# Patient Record
Sex: FEMALE | Race: WHITE
[De-identification: names, ages, dates, MRNs, and addresses within clinical notes are randomized per-mention and may not be internally consistent; named-entity substitution may affect disease eponyms.]

---

## 2017-12-30 NOTE — PHYS DOC
Past History


Past Medical History:  No Pertinent History


Past Surgical History:  , Other


Alcohol Use:  None


Drug Use:  None





Adult General


Chief Complaint


Chief Complaint:  DYSPNEA/RESPIRATOY DISTRESS





HPI


HPI





This patient is a pleasant 42-year-old female with a 3 to four-day history of 

URI like symptoms include runny nose, nonproductive cough that is not 

responding to Mucinex over-the-counter medications at home. Tonight she came in 

specifically because she developed a mild frontal headache that was lancinating 

in nature now resolved. It is not worse of life and sudden onset she just felt 

warm since of flushing along with this "" fever. She is not felt well for some 

time. She's had sick contacts at home with similar symptoms she has not used 

any recent antibiotic's, no travel outside the country, no chest pain, no 

abdominal pain, no nausea and vomiting with her symptoms. Patient further 

denies any joint pain, myalgias or rash. She denies any night sweats or weight 

loss. She denies any shortness of breath on exertion she denies any chest pain





Review of Systems


Review of Systems





Constitutional: Patient has had subjective fevers and chills


Eyes: Denies change in visual acuity, redness, or eye pain []


HENT: sHe has had nasal congestion and sore throat with cough but no change in 

voice


Respiratory he has had a nonproductive cough but no shortness of breath[]


Cardiovascular: No additional information not addressed in HPI []


GI: Denies abdominal pain, nausea, vomiting, bloody stools or diarrhea []


: Denies dysuria or hematuria []


Musculoskeletal: Denies back pain or joint pain []


Integument: Denies rash or skin lesions []


Neurologic: He did have a slight headache today this resolved not worse of life 

and sudden onset described as a lancing sharp on the right temple no sensory 

changes


Endocrine: Denies polyuria or polydipsia []





All other systems were reviewed and found to be within normal limits, except as 

documented in this note.





Current Medications


Current Medications





Current Medications








 Medications


  (Trade)  Dose


 Ordered  Sig/Jagdish  Start Time


 Stop Time Status Last Admin


Dose Admin


 


 Sodium Chloride  1,000 ml @ 


 1,000 mls/hr  1X  ONCE  17 18:30


 17 19:29  17 18:32


1,000 MLS/HR











Allergies


Allergies





Allergies








Coded Allergies Type Severity Reaction Last Updated Verified


 


  prochlorperazine Allergy Intermediate  17 Yes











Physical Exam


Physical Exam


Vital signs on the chart patient noted to be tachycardic like secondary to 

decreased fluid intake and her present not feeling well


Constitutional: Well developed, well nourished, no acute distress, non-toxic 

appearance. []


HENT: Normocephalic, atraumatic, bilateral external ears normal, oropharynx 

mildly dry, no oral exudates, patient is a clear rhinorrhea with mild nasal 

congestion no facial tenderness[]


Eyes: PERRLA, EOMI, conjunctiva normal, no discharge. [] 


Neck: Normal range of motion, no tenderness, supple, no stridor. [] 


Cardiovascular: Tachycardia noted no murmurs, rubs[]


Lungs & Thorax:  Bilateral breath sounds clear to auscultation []


Skin: Warm, dry, no erythema, no rash. [] 


Extremities: No tenderness, no cyanosis, no clubbing, ROM intact, no edema. [] 


Neurologic: Alert and oriented X 3, normal motor function, normal sensory 

function, no focal deficits noted. []


Psychologic: Affect normal, judgement normal, mood normal. []





Current Patient Data


Vital Signs





 Vital Signs








  Date Time  Temp Pulse Resp B/P (MAP) Pulse Ox O2 Delivery O2 Flow Rate FiO2


 


17 18:10 98.9 122 20  98 Room Air  











EKG


EKG


[]





Radiology/Procedures


Radiology/Procedures


[]Patient's PA and lateral chest x-ray reviewed and read by me at 7:30 PM 

demonstrates no acute infiltrate no pneumothorax, no pneumonia.





Course & Med Decision Making


Course & Med Decision Making


Pertinent Labs and Imaging studies reviewed. (See chart for details)


She presents with URI-like symptoms with subjective fever at home tachycardia 

noted on arrival I will screen patient flew as well as pneumonia. Patient's 

given a liter of fluids tachycardia is resolved. Patient's influenza swab was 

positive for flu B. Information was passed on the family bedside patient given 

dose Tamiflu.








[]





Dragon Disclaimer


Dragon Disclaimer


This electronic medical record was generated, in whole or in part, using a 

voice recognition dictation system.





Departure


Departure:


Impression:  


 Primary Impression:  


 Influenza B


Disposition:  01 HOME, SELF-CARE


Condition:  STABLE


Referrals:  


ANUPAMA SCHROEDER MD (PCP)


Patient Instructions:  Influenza Facts, Influenza, Adult





Additional Instructions:  


discharge:





I've spoken with the patient and/or caregivers. I've explained the patient's 

condition, diagnosis and treatment plan based on information available to me at 

this time. I've answered the patient's and/or caregivers questions and 

addressed any concerns. The patient and/or caregivers have a good understanding 

the patient's diagnosis, condition and treatment plan as can be expected at 

this point. Vital signs have been stabilized. The patient's condition is stable 

for discharge from the emergency department.





The patient will pursue further outpatient evaluation with her primary care 

provider or other designated consulting physician as outlined in the discharge 

instructions. Patient and/or caregivers are agreeable to this plan of care and 

follow-up instructions have been explained in detail. The patient and/or 

caregivers have received these instructions in written format and expressed 

understanding of these discharge instructions. The patient and her caregivers 

are aware that if any significant change in condition or worsening of symptoms 

should prompt him to immediately return to this of the closest emergency 

department.  If an emergent department is not readily available I would 

encourage him to call 911.


Scripts


Guaifenesin/Dextromethorphan (MUCINEX DM ER 1,200-60 MG TAB) 1 Each Tbmp.12hr


1 TAB PO BID, #20 TAB 1 Refill


   Prov: BETY CARRENO MD         17 


Naproxen (NAPROSYN) 500 Mg Tablet


1 TAB PO BID, #20 TAB 1 Refill


   Prov: BETY CARRENO MD         17 


Oseltamivir Phosphate (TAMIFLU) 75 Mg Capsule


1 CAP PO BID for 5 Days, #10 CAP


   Prov: BETY CARRENO MD         17











BETY CARRENO MD Dec 30, 2017 19:09

## 2017-12-31 NOTE — RAD
Chest radiograph 12/30/2017 8:17 PM



Indication: Cough, dizziness



Comparison: None available



Technique: PA and lateral views of the chest are provided.



Findings:



Cardiomediastinal silhouette is within normal limits. No pleural effusions,

pulmonary vascular congestion or pneumothorax. The lungs are clear.



Osseous structures are normal.



Impression: 



No acute cardiopulmonary process.

## 2018-01-02 NOTE — RAD
CTA of the chest with contrast, 1/2/2018:



History: Elevated d-dimer, flu symptoms



Multidetector CT imaging was performed following an IV bolus injection of

iodinated contrast material. Multiplanar reconstructions were produced

including coronal and sagittal MIP images.



The main and lobar pulmonary arteries show no filling defects to suggest

pulmonary emboli. Some of the smaller pulmonary arteries are less well

opacified due to technical factors. No definite pulmonary emboli are

identified. The thoracic aorta is unremarkable. No mediastinal or hilar

adenopathy is seen.



There is minimal linear atelectasis or scarring posteriorly in the right lung

base. No pulmonary mass is identified. There is no evidence of pleural fluid.



The liver is of lower than normal density in a diffuse pattern compatible with

fatty change. Numerous calcified gallstones are present in the gallbladder.

The gallbladder is not distended and no pericholecystic edema is evident.





IMPRESSION:

1. No CT evidence of central pulmonary emboli.

2. Minimal right basilar atelectasis.

3. Hepatic steatosis.

4. Cholelithiasis











PQRS Compliance Statement:



One or more of the following individualized dose reduction techniques were

utilized for this examination:

1. Automated exposure control

2. Adjustment of the mA and/or kV according to patient size

3. Use of iterative reconstruction technique

## 2018-01-02 NOTE — RAD
INDICATION: tingling around the  mouth



COMPARISON: None.



TECHNIQUE: Axial CT images obtained through the head without intravenous

contrast.



FINDINGS:



No intracranial hemorrhage.

No midline shift.  Basal cisterns patents.

Ventricles and sulci are unremarkable.

No acute osseous abnormality.

Orbits and paranasal sinuses partially seen and unremarkable.





IMPRESSION:

1. No acute intracranial hemorrhage.





PQRS Compliance Statement:



One or more of the following individualized dose reduction techniques were

utilized for this examination:

1. Automated exposure control

2. Adjustment of the mA and/or kV according to patient size

3. Use of iterative reconstruction technique

## 2018-01-02 NOTE — EKG
[facility] [address], [place] [address]

Test Date:    2018               Test Time:    03:17:59

Pat Name:     HERMINIO SORIANO         Department:   

Patient ID:   [id number]           Room:         120 A

Gender:       F                        Technician:   CONNIE

:          1975               Requested By: BETY CARRENO

Order Number: [id number]            Reading MD:   Gareth Navarro MD

                                 Measurements

Intervals                              Axis          

Rate:         126                      P:            -54

CA:           108                      QRS:          -5

QRSD:         84                       T:            26

QT:           352                                    

QTc:          510                                    

                           Interpretive Statements

SINUS TACHYCARDIA

NON-SPECIFIC ST/T CHANGES

Electronically Signed On 2018 12:38:30 CST by Gareth Navarro MD

## 2018-01-02 NOTE — HP
ADMIT DATE:  2018



HISTORY OF PRESENT ILLNESS:  A 42-year-old female came in through the Emergency

Room 5-day history of nonproductive cough and having difficulty with breathing,

severe frontal headache, fever up to 103.  The patient was admitted for further

evaluation and treatment, had influenza precautions made, continue to monitor

the patient, currently make further evaluation on her as indicated.



PAST MEDICAL HISTORY:  No pertinent history.



PAST SURGICAL HISTORY:  .



SOCIAL HISTORY:  Denies alcohol or drug use or smoking.



FAMILY HISTORY:  Noncontributory.



ALLERGIES:  COMPAZINE.



HOME MEDICATIONS:  Mucinex, naproxen 500 b.i.d., Tamiflu 75 mg b.i.d.



REVIEW OF SYSTEMS:  Positive for generalized achiness, fever, chills.  The

patient's sedimentation rate elevated, severe soreness over the right shoulder.



PHYSICAL EXAMINATION:

GENERAL:  She is a pleasant white female, moderate amount of distress.

VITAL SIGNS:  Blood pressure 116/70, respiratory rate 22, pulse 136, temperature

of 103.

HEENT:  The patient's head was atraumatic, normocephalic.  Eyes:  PERRLA without

jaundice.  The mouth and throat were normal.

NECK:  Supple, without JVD, carotid bruits.  No thyromegaly.

Some tenderness over the right shoulder area; some redness, tenderness noted.

EXTREMITIES:  No clubbing, cyanosis, or edema.

RESPIRATORY:  Lungs are diminished throughout, poor movement of air, but clear.

CARDIOVASCULAR:  Regular sinus rhythm, S1, S2, tachycardic.

ABDOMEN:  Soft, nontender, no rebound or guarding.  Positive bowel sounds, no

hepatosplenomegaly.  No nuchal rigidity.

NEUROLOGIC:  Alert and oriented x 3.  Speech fluent, spontaneous, appropriate. 

Cranial nerves 2-12 grossly intact, answering all questions well.



LABORATORY DATA:  The white count was basically unremarkable except for a sed

rate of 66.  Chemistries outside of a blood sugar of 150, was unremarkable. 

Coags did show an elevated D-dimer.  CTA was unremarkable.  The patient

otherwise will be continued to be monitored carefully, make further evaluation

on her.



IMPRESSION:  Influenza, dehydration, sinus tachycardia, IV fluids, IV antibiotic

for possible cellulitis to the right shoulder, and have further evaluation as

indicated per results.





______________________________

PETER J. ALEXANDRE, MD



DR:  CHUCK/king  JOB#:  [id number] / [id number]

DD:  2018 18:35  DT:  2018 19:06

## 2018-01-02 NOTE — RAD
Chest, 2 views, 1/2/2018:



History: Cough, worsening shortness of breath



Comparison is made to a study from 12/30/2017. The heart size and pulmonary

vascularity are normal. No pulmonary infiltrates are seen. There is no

evidence of pleural fluid.



IMPRESSION: No acute cardiopulmonary abnormality is detected.

## 2018-01-02 NOTE — PHYS DOC
Past History


Past Medical History:  No Pertinent History


Past Surgical History:  , Other


Alcohol Use:  None


Drug Use:  None





Adult General


Chief Complaint


Chief Complaint:  SHORTNESS OF BREATH





HPI


HPI





This patient is a pleasant 42-year-old female with a 5-day history of URI like 

symptoms include runny nose, nonproductive cough that is not responding to 

Mucinex over-the-counter medications at home. Tonight she came in specifically 

because she developed a mild frontal headache that was lancinating in nature 

now resolved. It is not worse of life and sudden onset she just felt warm since 

of flushing along with this "" fever. She is not felt well for some time. She's 

had sick contacts at home with similar symptoms she has not used any recent 

antibiotic's, no travel outside the country, no chest pain, no abdominal pain, 

no nausea and vomiting with her symptoms. Patient further denies any joint pain

, myalgias or rash. She denies any night sweats or weight loss. Tonight she 

returns again because she has worsening shortness of breath and fever now to 

103. She is not on antibiotics. She denies any recent travel outside the 

country is having significant sick contacts at home





Review of Systems


Review of Systems





Constitutional: Fevers to 103.10 chills


Eyes: Denies change in visual acuity, redness, or eye pain []


HENT: Increasing his congestion or sore throat with cough


Respiratory: Increasing cough and now shortness of breath


Cardiovascular: No additional information not addressed in HPI []


GI: Denies abdominal pain, nausea, vomiting, bloody stools or diarrhea []


: Denies dysuria or hematuria []


Musculoskeletal: Denies back pain or joint pain []


Integument: Denies rash or skin lesions []


Neurologic: Mild frontal headache nothing new weakness or sensory changes


Endocrine: Denies polyuria or polydipsia []





All other systems were reviewed and found to be within normal limits, except as 

documented in this note.





Allergies


Allergies





Allergies








Coded Allergies Type Severity Reaction Last Updated Verified


 


  prochlorperazine Allergy Intermediate  17 Yes











Physical Exam


Physical Exam


Other vital signs recorded the chart patient tachycardic to 130s with fever 103 

appropriate.


Constitutional: Well developed, well nourished, patient feels uncomfortable no 

active acute distress nontoxic


HENT: Normocephalic, atraumatic, bilateral external ears normal, oropharynx 

moist, mild erythema no oral exudates, nose normal. []


Eyes: PERRLA, EOMI, conjunctiva normal, no discharge. [] 


Neck: Normal range of motion, no tenderness, supple, coarse respirations with 

no obvious stridor[] 


Cardiovascular:Heart rate regular rhythm, no murmur []


Lungs & Thorax:  Bilateral breath sounds clear to auscultation []


Skin: Warm,  Patient's skin. Feels warm and flushed it was noted later in the 

patient's evaluation by the nurse and then passed on to me the patient had a 

significant area of erythema and warmth without induration of the right 

shoulder complaints most of the shoulder anterior portion of the biceps and the 

chest wall.[] 


Back: No tenderness, no CVA tenderness. [] 


Extremities: No tenderness, no cyanosis, no clubbing, ROM intact, no edema. [] 


Neurologic: Alert and oriented X 3, normal motor function, normal sensory 

function, no focal deficits noted. []





Current Patient Data


Lab Results





 Laboratory Tests








Test


  18


03:20


 


White Blood Count


  8.0 x10^3/uL


(4.0-11.0)


 


Red Blood Count


  4.48 x10^6/uL


(3.50-5.40)


 


Hemoglobin


  12.0 g/dL


(12.0-15.5)


 


Hematocrit


  35.9 %


(36.0-47.0)  L


 


Mean Corpuscular Volume


  80 fL ()


 


 


Mean Corpuscular Hemoglobin 27 pg (25-35)  


 


Mean Corpuscular Hemoglobin


Concent 34 g/dL


(31-37)


 


Red Cell Distribution Width


  16.7 %


(11.5-14.5)  H


 


Platelet Count


  178 x10^3/uL


(140-400)


 


Neutrophils (%) (Auto) 91 % (31-73)  H


 


Lymphocytes (%) (Auto) 4 % (24-48)  L


 


Monocytes (%) (Auto) 5 % (0-9)  


 


Eosinophils (%) (Auto) 0 % (0-3)  


 


Basophils (%) (Auto) 0 % (0-3)  


 


Neutrophils # (Auto)


  7.3 x10^3uL


(1.8-7.7)


 


Lymphocytes # (Auto)


  0.3 x10^3/uL


(1.0-4.8)  L


 


Monocytes # (Auto)


  0.4 x10^3/uL


(0.0-1.1)


 


Eosinophils # (Auto)


  0.0 x10^3/uL


(0.0-0.7)


 


Basophils # (Auto)


  0.0 x10^3/uL


(0.0-0.2)











EKG


EKG


EKG read by me time 3:17 a.m. 2000 618 devastate sinus tachycardia P 

whenever QRS with a heart rate of 126. MN interval 108 QRS width of 84 to smoke

, QTC is 410 which is abnormally long prolonged QT patient is no ST segment or T

-wave changes consistent with acute coronary ischemia. She has some T-wave 

flattening lead 3 which may be rate related[]





Radiology/Procedures


Radiology/Procedures


[]





Course & Med Decision Making


Course & Med Decision Making


Pertinent Labs and Imaging studies reviewed. (See chart for details)





[]A she presents for the second time him on part with worsening symptoms of 

cough fever not feeling well. Patient was diagnosed influenza several days ago 

and placed on Tamiflu. She is been taking Tylenol when necessary for her other 

symptoms but she was worried about this increasing cough. Patient's chest x-ray 

which was repeated today PA and lateral read by me to Mrs. no acute 

cardiopulmonary infiltrate or other finding. Patient's CBC is normal. Time is 

now 3:15 AM





At 4:00 AM nurse noted increased warmth and induration although she was not 

complaining of any right shoulder pain or chest wall pain patient had no other 

complaints. This may be the source of her fever and increasing myalgias. At 

this point because patient patient's tachycardia and fever improved with fluids 

and Tylenol. I will add blood cultures and lactic acid this point initiate 

antibiotics for potential skin infection because of high fevers although do not 

believe it's neckties and fasciitis who treated aggressively a fourth 

generation penicillin and a vancomycin dose. Patient only admitted to the 

hospital for closer monitoring and evaluation





Consultant note: Dr. Kedar Almaraz





Consultant called at of the service service: 4:10 AM


Consult called back at 4:11 AM





Discussed the case I presented and they agreed with admission. Time of 

acceptance 411 AM





Lesia Disclaimer


Lesia Disclaimer


This electronic medical record was generated, in whole or in part, using a 

voice recognition dictation system.





Departure


Departure:


Impression:  


 Primary Impression:  


 Influenza


 Additional Impressions:  


 Fever


 Cellulitis


Disposition:  09 ADMITTED AS INPATIENT


Condition:  GUARDED


Referrals:  


KEDAR ALMARAZ MD (PCP)





Problem Qualifiers











BETY CARRENO MD 2018 03:11

## 2018-01-03 NOTE — CARD
MR#: [id number]

Account#: [id number]

Accession#: [id number]

Date of Study: 01/02/2018

Ordering Physician: ANUPAMA SCHROEDER, 

Referring Physician: ANUPAMA SCHROEDER, 

Tech: Krys Manuel RDCS





--------------- APPROVED REPORT --------------





EXAM: Two-dimensional and M-mode echocardiogram with Doppler and color Doppler.



Other Information 

Quality : AverageHR: 90bpm

Rhythm : NSR



INDICATION

Sinus Tachycardia



2D DIMENSIONS 

Left Atrium(2D)3.8 (1.6-4.0cm)IVSd0.7 (0.7-1.1cm)

Aortic Root(2D)2.6 (2.0-3.7cm)LVDd5.0 (3.9-5.9cm)

LVOT Diameter2.3 (1.8-2.4cm)PWd0.7 (0.7-1.1cm)

LVDs3.2 (2.5-4.0cm)FS (%) 35.6 %

SV77.0 mlLVEF(%)64.9 (>50%)



Aortic Valve

AoV Peak Thien.157.7cm/sAoV VTI28.1cm

AO Peak GR.10.0mmHgLVOT Peak Thien.122.9cm/s

LVOT  VTI 23.22cmAO Mean GR.5mmHg

OMAIRA (VMAX)3.12qk4WJM   (VTI)3.34cm2



Mitral Valve

MV E Zjawnsod77.5cm/sMV E Peak Gr.4mmHg

MV DECEL [id number] A Fbheulfk55.6cm/s

MV E Mean Gr.2mmHgE/A  Ratio1.1



Pulmonary Valve

PV Peak Oxfstacd374.8cm/sPV Peak Grad.6mmHg



Tricuspid Valve

TR P. Jfknmqcf312oj/sTR Peak Gr.26mmHg



Pulmonary Vein

S1 Guzhvkcg54.7cm/sD2 Mpdrvrti79.3cm/s



 LEFT VENTRICLE 

The left ventricle is normal size. There is normal left ventricular wall thickness. The left ventricu
lar systolic function is normal and the ejection fraction is within normal range. EF 65% There is nor
mal LV segmental wall motion. The left ventricular diastolic function and filling is normal for age.



 RIGHT VENTRICLE 

The right ventricle is normal size. There is normal right ventricular wall thickness. The right ventr
icular systolic function is normal.



 ATRIA 

The left atrium size is normal. The right atrium size is normal. The interatrial septum is intact wit
h no evidence for an atrial septal defect or patent foramen ovale as noted on 2-D or Doppler imaging.




 AORTIC VALVE 

The aortic valve is normal in structure and function. Doppler and Color Flow revealed no significant 
aortic regurgitation. There is no significant aortic valvular stenosis. There is no aortic valvular v
egetation.



 MITRAL VALVE 

The mitral valve is normal in structure and function. There is no evidence of mitral valve prolapse. 
There is no mitral valve stenosis. Doppler and Color Flow revealed no mitral valve regurgitation note
d.



 TRICUSPID VALVE 

The tricuspid valve is normal in structure and function. Doppler and Color Flow revealed trace tricus
pid regurgitation. There is no tricuspid valve prolapse or vegetation. There is no tricuspid valve st
enosis.



 PULMONIC VALVE 

Doppler and Color Flow revealed trace pulmonic valvular regurgitation. There is no pulmonic valvular 
stenosis.



 GREAT VESSELS 

The aortic root is normal in size. The ascending aorta is normal in size. IVC not well visualized. 



 PERICARDIAL EFFUSION 

There is no pleural effusion. There is no evidence of significant pericardial effusion.



Critical Notification

Critical Value: No



<Conclusion>

The left ventricular systolic function is normal and the ejection fraction is within normal range. EF
 65%

There is normal LV segmental wall motion.

The left ventricular diastolic function and filling is normal for age.



Signed by : Gareth Navarro, 

Electronically Approved : 01/03/2018 09:20:32

## 2018-01-03 NOTE — CONS
DATE OF CONSULTATION:  2018



NEUROLOGIC CONSULTATION



REFERRING PHYSICIAN:  Dr. Almaraz.



REASON FOR CONSULTATION:  Numbness and paresthesia of the face and upper

extremities.



HISTORY OF PRESENT ILLNESS:  This is a 42-year-old right-handed white female,

who was admitted through Emergency Room after she presented with 5-day history

of upper respiratory infections and flu-like symptoms described as burning nose

with nonproductive cough and recently having fever and severe frontal headaches

without nausea, vomiting, photophobia or phonophobia.  The patient has recently

noticed increased numbness and intermittent numbness and paresthesia of the face

bilaterally and sometimes of the upper and lower extremities.  She denies

itching or having rash.  She also complains of shortness of breath without chest

pain.  High temperature today was 103.  She was admitted for further evaluation

for possible systemic infections.  The patient also complains of intermittent

numbness and paresthesia of the distal upper extremities and with nocturnal

exacerbations. She denies weakness, vertigo, dysarthria, dysphagia or visual

disturbances.



PAST MEDICAL HISTORY:  Unremarkable.



PAST SURGICAL HISTORY:  Significant for .



SOCIAL HISTORY:  The patient denies smoking, alcohol drinking, or illicit drug

use.



FAMILY HISTORY:  Noncontributory.



ALLERGIES:  COMPAZINE.



CURRENT HOME MEDICATIONS:  Mucinex, Tamiflu 75 mg twice daily, and naproxen 500

mg twice daily.



REVIEW OF SYSTEMS:  A 10-point review of system was performed and as mentioned

above in history of present illness.



PHYSICAL EXAMINATION:

GENERAL:  Well-developed and well-nourished white female, in no acute distress.

VITAL SIGNS:  Blood pressure 148/52, respiratory rate is 20, pulse is 85, oxygen

saturation 97% and temperature _____.

HEENT:  Normocephalic, atraumatic, otherwise unremarkable.

NECK:  Supple.  Negative for carotid bruit, lymphadenopathy, thyromegaly  or

JVD.

LUNGS:  Clear to A and P.

CARDIOVASCULAR:  Regular rate and rhythm, normal S1 and S2.  There is no S3, S4

or murmur.

ABDOMEN:  Soft.  Bowel sounds positive.

EXTREMITIES:  Negative for cyanosis, clubbing or pitting edema.

NEUROLOGIC:  Tinel's and Phalen's signs were subjectively present over the

median nerve at the wrist bilaterally.

MENTAL STATUS:  The patient is alert and oriented x 3.  The speech is fluent. 

There is no language dysfunction.  Memory, judgment, and abstract thinking are

normal.  The patient denies hallucination or delusion.

CRANIAL NERVES:  Visual fields are full.  The pupils are reactive to light and

accommodation.  Extraocular movements are intact.  There is no nystagmus.  There

is no facial motor or sensory deficit.  Hearing is intact bilaterally.  The

palate is elevated symmetrically.  Sternocleidomastoid muscles are powerful

bilaterally.  The patient shrugs her shoulders symmetrically.  Protrudes her

tongue in the midline without fasciculation or atrophy.

MOTOR:  No focal muscle bulk was seen.  The tone is normal.  The strength is 5/5

throughout.

SENSORY:  Examination revealed diminished pinprick and light touch senses in

both hands in patchy distributions.  Otherwise unremarkable.   Deep tendon

reflexes were symmetric and active without pathology responses.  Gait and

coordination are normal.



DIAGNOSTIC DATA:  Nonenhanced CT scan revealed no evidence of acute intracranial

process.  CT angio revealed no evidence of pulmonary emboli, but showed right

basilar atelectasis, with hepatic steatosis and cholelithiasis.  Chest x-ray

revealed no acute cardiopulmonary process.



LABORATORY DATA:  CBC revealed white blood cells of 8000, hemoglobin 12,

hematocrit 35.9, and platelet count 178,000 with left shift.  Chemistry revealed

sodium of 139, potassium 3.7, chloride 101, CO2 of 24, BUN 8, creatinine 0.9,

blood glucose 150, A1c is 5.4, lactic acid 0.7, calcium 8.9. Troponin level is

less than 0.017.  Coagulation revealed _____-dimer of 0.64.  Influenza A is

negative but influenza B is positive.



IMPRESSION:

1.  Flu-like symptoms, upper respiratory infection with fever of 103 and

headaches with normal neurologic examination.

2.  Numbness and paresthesia of the upper extremities with positive Tinel's and

Phalen's signs suggestive of entrapment neuropathy - carpal tunnel syndrome.

3.  Abnormal CT angio consistent with cholelithiasis and hepatic steatosis and

negative for pulmonary embolism.



RECOMMENDATIONS:

1.  Continue with current management initiated by Dr. Almaraz.  Rule out viral

infection.

2.  We will arrange for EMG/NCS of the upper extremities to rule out entrapment

neuropathy versus cervical radiculopathy.

3.  Continue with current management.





______________________________

M NICOLE SMITH MD



DR:  LINDSEY/king  JOB#:  [id number] / [id number]

DD:  2018 00:03  DT:  2018 10:25

## 2018-01-03 NOTE — PN
DATE:  01/03/2018



SUBJECTIVE:  The patient stated she has had intermittent numbness and

paresthesia of the face and she related that to possible side effects of

vancomycin.  Therefore, she refused to have the second treatment of vancomycin. 

Otherwise, she denies any other new medical or neurological complaints.



OBJECTIVE:

GENERAL:  Well-developed, well-nourished white female, not in acute distress.

VITAL SIGNS:  Blood pressure is 142/90, respiratory rate 20, pulse 75 and

regular, temperature 97.6, oxygen saturation 98% on room air.

HEENT:  Normocephalic, atraumatic, otherwise unremarkable.

NECK:  Supple.  Negative for carotid bruit, lymphadenopathy or thyromegaly.

LUNGS:  Clear to A and P.

CARDIOVASCULAR:  Regular rate and rhythm, normal S1, S2.  There is no S3, S4 or

murmur.

ABDOMEN:  Soft.  Bowel sounds positive.

EXTREMITIES:  Negative for cyanosis, clubbing or pitting edema.

NEUROLOGICAL EXAM:  Mental Status:  The patient is alert and oriented x 3.  The

speech is fluent.  There is no language dysfunction.  Cranial nerves are intact.

 No focal motor or sensory deficit.  Tinel's and Phalen's signs were present

over the median nerve at the wrist bilaterally.  Deep tendon reflexes are

symmetric and active without pathologic responses.  Gait and coordination are

normal.



LABORATORY DATA:  CBC revealed white blood cells of 5.5 thousand, hemoglobin

10.6, hematocrit 31.6, platelet count 177,000.  Chemistry revealed sodium of

144, potassium of 3.2, chloride 108, CO2 is 25, BUN 8, creatinine 0.9.  Glucose

126 and calcium 8.2.



IMPRESSION:

1.  Numbness and paresthesia of the face with fever and headaches -- resolved,

probably due to underlying viral infection -- flu.

2.  Numbness and paresthesia of the hands with nocturnal exacerbation, rule out

entrapment neuropathy in the wrist -- carpal tunnel syndrome.



RECOMMENDATIONS:

1.  Continue with current management as initiated by Dr. Almaraz.

2.  We will follow up the patient on an outpatient basis and arrange for EMG/NCS

to rule out entrapment neuropathy in the hands -- carpal tunnel syndrome.





______________________________

M NICOLE SMITH MD



DR:  LINDSEY/king  JOB#:  [id number] / [id number]

DD:  01/03/2018 09:37  DT:  01/03/2018 21:05

## 2018-01-03 NOTE — DS
DATE OF DISCHARGE:  



HOSPITAL COURSE:  This is a 42-year-old female who came in through the Emergency

Room with elevated temperature up to 103 degrees.  The patient was admitted with

her influenza.  There is a possibility of cellulitis to the right arm, but that

really was not significant.  She did have an elevated D-dimer.  CTA was

negative.  The patient had generalized achiness, fever or chills and so forth

consistent with the influenza which was confirmed with her testing.  The patient

had a sed rate of 66.  Her hemoglobin was slightly low at 10.6 and 31.  The

patient made excellent progress with hydration and making good progress.  Her

A1c was 5.4, glucose 126, potassium slightly low at 3.2, to be corrected as an

outpatient and the like.  The patient otherwise made excellent progress during

the rest of her hospitalization, discharged home without complications of her

spreading the disease otherwise.



IMPRESSION:  Influenza A, anemia of probable iron deficiency, hyperglycemia,

hypokalemia, morbid obesity.



PLAN:  The patient will be discharged home.  Follow up as an outpatient. 

Continue her Tamiflu.  Work release.  Regular diet, decreased activity.  Follow

up in 7-10 days or sooner as needed.





______________________________

ANUPAMA SCHROEDER MD



DR:  CHUCK/king  JOB#:  [id number] / [id number]

DD:  01/03/2018 10:21  DT:  01/03/2018 10:42

## 2018-03-13 ENCOUNTER — HOSPITAL ENCOUNTER (OUTPATIENT)
Dept: HOSPITAL 61 - KCIC MRI | Age: 43
Discharge: HOME | End: 2018-03-13
Payer: COMMERCIAL

## 2018-03-13 DIAGNOSIS — R20.2: ICD-10-CM

## 2018-03-13 DIAGNOSIS — R20.0: ICD-10-CM

## 2018-03-13 DIAGNOSIS — G51.9: Primary | ICD-10-CM

## 2018-03-13 PROCEDURE — 70551 MRI BRAIN STEM W/O DYE: CPT

## 2021-02-18 ENCOUNTER — HOSPITAL ENCOUNTER (EMERGENCY)
Dept: HOSPITAL 63 - ER | Age: 46
Discharge: HOME | End: 2021-02-18
Payer: COMMERCIAL

## 2021-02-18 VITALS — WEIGHT: 213.41 LBS | HEIGHT: 61 IN | BODY MASS INDEX: 40.29 KG/M2

## 2021-02-18 VITALS — SYSTOLIC BLOOD PRESSURE: 138 MMHG | DIASTOLIC BLOOD PRESSURE: 79 MMHG

## 2021-02-18 DIAGNOSIS — Z88.1: ICD-10-CM

## 2021-02-18 DIAGNOSIS — Z88.8: ICD-10-CM

## 2021-02-18 DIAGNOSIS — I10: Primary | ICD-10-CM

## 2021-02-18 LAB
ALBUMIN SERPL-MCNC: 3.5 G/DL (ref 3.4–5)
ALP SERPL-CCNC: 103 U/L (ref 46–116)
ALT SERPL-CCNC: 31 U/L (ref 14–59)
AMPHETAMINE/METHAMPHETAMINE: (no result)
ANION GAP SERPL CALC-SCNC: 12 MMOL/L (ref 6–14)
APTT PPP: YELLOW S
AST SERPL-CCNC: 17 U/L (ref 15–37)
BACTERIA #/AREA URNS HPF: (no result) /HPF
BARBITURATES UR-MCNC: (no result) UG/ML
BASOPHILS # BLD AUTO: 0.1 X10^3/UL (ref 0–0.2)
BASOPHILS NFR BLD: 1 % (ref 0–3)
BENZODIAZ UR-MCNC: (no result) UG/L
BILIRUB DIRECT SERPL-MCNC: 0.1 MG/DL (ref 0–0.2)
BILIRUB SERPL-MCNC: 0.2 MG/DL (ref 0.2–1)
BILIRUB UR QL STRIP: (no result)
CA-I SERPL ISE-MCNC: 16 MG/DL (ref 7–20)
CALCIUM SERPL-MCNC: 9.1 MG/DL (ref 8.5–10.1)
CANNABINOIDS UR-MCNC: (no result) UG/L
CHLORIDE SERPL-SCNC: 104 MMOL/L (ref 98–107)
CHOLEST/HDLC SERPL: 3 {RATIO}
CO2 SERPL-SCNC: 24 MMOL/L (ref 21–32)
COCAINE UR-MCNC: (no result) NG/ML
CREAT SERPL-MCNC: 1 MG/DL (ref 0.6–1)
EOSINOPHIL NFR BLD: 0.2 X10^3/UL (ref 0–0.7)
EOSINOPHIL NFR BLD: 3 % (ref 0–3)
ERYTHROCYTE [DISTWIDTH] IN BLOOD BY AUTOMATED COUNT: 15.9 % (ref 11.5–14.5)
FIBRINOGEN PPP-MCNC: CLEAR MG/DL
GFR SERPLBLD BASED ON 1.73 SQ M-ARVRAT: 60.2 ML/MIN
GLUCOSE SERPL-MCNC: 136 MG/DL (ref 70–99)
GLUCOSE UR STRIP-MCNC: (no result) MG/DL
HCT VFR BLD CALC: 36.9 % (ref 36–47)
HDLC SERPL-MCNC: 47 MG/DL (ref 40–60)
HGB BLD-MCNC: 11.9 G/DL (ref 12–15.5)
LDLC: 81 MG/DL (ref 0–100)
LIPASE: 114 U/L (ref 73–393)
LYMPHOCYTES # BLD: 1.4 X10^3/UL (ref 1–4.8)
LYMPHOCYTES NFR BLD AUTO: 20 % (ref 24–48)
MAGNESIUM SERPL-MCNC: 2 MG/DL (ref 1.8–2.4)
MCH RBC QN AUTO: 27 PG (ref 25–35)
MCHC RBC AUTO-ENTMCNC: 32 G/DL (ref 31–37)
MCV RBC AUTO: 83 FL (ref 79–100)
METHADONE SERPL-MCNC: (no result) NG/ML
MONO #: 0.5 X10^3/UL (ref 0–1.1)
MONOCYTES NFR BLD: 7 % (ref 0–9)
NEUT #: 4.8 X10^3UL (ref 1.8–7.7)
NEUTROPHILS NFR BLD AUTO: 69 % (ref 31–73)
NITRITE UR QL STRIP: (no result)
OPIATES UR-MCNC: (no result) NG/ML
PCP SERPL-MCNC: (no result) MG/DL
PLATELET # BLD AUTO: 307 X10^3/UL (ref 140–400)
POTASSIUM SERPL-SCNC: 3.7 MMOL/L (ref 3.5–5.1)
PROT SERPL-MCNC: 7.7 G/DL (ref 6.4–8.2)
RBC # BLD AUTO: 4.43 X10^6/UL (ref 3.5–5.4)
RBC #/AREA URNS HPF: 0 /HPF (ref 0–2)
SODIUM SERPL-SCNC: 140 MMOL/L (ref 136–145)
SP GR UR STRIP: 1.01
SQUAMOUS #/AREA URNS LPF: (no result) /LPF
THYROID STIM HORMONE (TSH): 2.98 UIU/ML (ref 0.36–3.74)
TRIGL SERPL-MCNC: 235 MG/DL (ref 0–150)
UROBILINOGEN UR-MCNC: 0.2 MG/DL
VLDLC: 47 MG/DL (ref 0–40)
WBC # BLD AUTO: 7 X10^3/UL (ref 4–11)
WBC #/AREA URNS HPF: (no result) /HPF (ref 0–4)

## 2021-02-18 PROCEDURE — 85025 COMPLETE CBC W/AUTO DIFF WBC: CPT

## 2021-02-18 PROCEDURE — 36415 COLL VENOUS BLD VENIPUNCTURE: CPT

## 2021-02-18 PROCEDURE — 81001 URINALYSIS AUTO W/SCOPE: CPT

## 2021-02-18 PROCEDURE — 96360 HYDRATION IV INFUSION INIT: CPT

## 2021-02-18 PROCEDURE — 83690 ASSAY OF LIPASE: CPT

## 2021-02-18 PROCEDURE — 93005 ELECTROCARDIOGRAM TRACING: CPT

## 2021-02-18 PROCEDURE — 87086 URINE CULTURE/COLONY COUNT: CPT

## 2021-02-18 PROCEDURE — 80307 DRUG TEST PRSMV CHEM ANLYZR: CPT

## 2021-02-18 PROCEDURE — 85379 FIBRIN DEGRADATION QUANT: CPT

## 2021-02-18 PROCEDURE — 80061 LIPID PANEL: CPT

## 2021-02-18 PROCEDURE — 84484 ASSAY OF TROPONIN QUANT: CPT

## 2021-02-18 PROCEDURE — 80048 BASIC METABOLIC PNL TOTAL CA: CPT

## 2021-02-18 PROCEDURE — 99284 EMERGENCY DEPT VISIT MOD MDM: CPT

## 2021-02-18 PROCEDURE — 82550 ASSAY OF CK (CPK): CPT

## 2021-02-18 PROCEDURE — 85610 PROTHROMBIN TIME: CPT

## 2021-02-18 PROCEDURE — 85730 THROMBOPLASTIN TIME PARTIAL: CPT

## 2021-02-18 PROCEDURE — 80076 HEPATIC FUNCTION PANEL: CPT

## 2021-02-18 PROCEDURE — 84443 ASSAY THYROID STIM HORMONE: CPT

## 2021-02-18 PROCEDURE — 96361 HYDRATE IV INFUSION ADD-ON: CPT

## 2021-02-18 PROCEDURE — 83735 ASSAY OF MAGNESIUM: CPT

## 2021-02-18 NOTE — EKG
[facility] [address], [place] [address]

Test Date:    2021               Test Time:    01:14:56

Pat Name:     HERMINIO SORIANO         Department:   

Patient ID:   [id number]           Room:          

Gender:       F                        Technician:   STEFANIA

:          1975               Requested By: ADY SOSA

Order Number: [id number]            Reading MD:     

                                 Measurements

Intervals                              Axis          

Rate:         97                       P:            52

NH:           178                      QRS:          14

QRSD:         88                       T:            43

QT:           372                                    

QTc:          477                                    

                           Interpretive Statements

SINUS RHYTHM

PROLONGED QT

NO SPECIFIC ECG ABNORMALITIES

RI6.02

No previous ECG available for comparison

## 2021-02-18 NOTE — PHYS DOC
Past History


Past Medical History:  No Pertinent History


Past Medical History


PSVT


Past Surgical History:  , Other


Alcohol Use:  None


Drug Use:  None





General Adult


EDM:


Chief Complaint:  Palpitations





HPI:


HPI:


'..I was sound asleep and woke up with felt .. like my heart was going beat out 

of chest.. I ve had this before.. the did all kinds of cardiac work up,.. stress

tests,  treadmill... echo.. they had me on a metoprolol pill for a while.. it 

helped.. but daija stop taking it after a while... He did have me on a Holter 

monitor for a while...





Patient is a 45 year old female who presents with above hx and complaints of 

palpitations.  Patient denies any increase in caffeine or over-the-counter 

stimulants.  Patient denies any recent new meds.  No history of trauma.  No 

history of recent travel or severe ill contacts.  Patient normally healthy 

except for palpitations.  There is family history of anxiety with her mother and

father  of COPD at age 72.  She has a half brother that is well.





Review of Systems:


Review of Systems:


Constitutional:  Denies fever or chills 


Eyes:  Denies change in visual acuity 


HENT:  Denies nasal congestion or sore throat 


Respiratory:  Denies cough or shortness of breath 


Cardiovascular: Complaints of palpitations, tachycardia


GI:  Denies abdominal pain, nausea, vomiting, bloody stools or diarrhea 


: Denies dysuria 


Musculoskeletal:  Denies back pain or joint pain 


Integument:  Denies rash 


Neurologic:  Denies headache, focal weakness or sensory changes 


Endocrine:  Denies polyuria or polydipsia 


Lymphatic:  Denies swollen glands 


Psychiatric:  Denies depression or anxiety





Family History:


Family History:


Mother has anxiety, father  of COPD





Current Medications:


Current Meds:


See nursing for home meds





Allergies:


Allergies:





Allergies








Coded Allergies Type Severity Reaction Last Updated Verified


 


  prochlorperazine Allergy Intermediate  21 Yes


 


  vancomycin Allergy Unknown  21 Yes











Physical Exam:


PE:





Constitutional:  no acute distress, non-toxic appearance. []


HENT: Normocephalic, atraumatic, bilateral external ears normal, oropharynx 

moist, no oral exudates, nose normal. []


Eyes: PERRLA, EOMI, conjunctiva normal, no discharge. [] 


Neck: Normal range of motion, no tenderness, supple, no stridor.  Full thyroid


Cardiovascular: Tachycardia heart rate regular rhythm, no murmur []


Lungs & Thorax:  Bilateral breath sounds equal at apex on auscultation []


Abdomen: Bowel sounds normal, soft, no tenderness, no masses, no pulsatile 

masses.  Obese. Old surgery scar. 


Skin: Warm, dry, no erythema, no rash. [] 


Back: No tenderness, no CVA tenderness. [] 


Extremities: No tenderness, no cyanosis, no clubbing, ROM intact, no edema.  No 

cording appreciated


Neurologic: Alert and oriented X 3, normal motor function, normal sensory 

function, no focal deficits noted. []


Psychologic: Affect just , judgement normal, mood normal. []





EKG:


EKG:


My interpretation EKG shows sinus rhythm at 97 bpm.  Does have slightly 

prolonged QT interval at 372 ms.  QTc intervals 477 ms.  But no findings of 

acute STEMI of contralateral changes.  []





Radiology/Procedures:


Radiology/Procedures:


[]





Heart Score:


HEART Score for Chest Pain:  








HEART Score for Chest Pain Response (Comments) Value


 


History Slighlty/Non-Suspicious 0


 


ECG Normal 0


 


Age < 45 0


 


Risk Factors No Risk Factors 0


 


Troponin < Normal Limit 0


 


Total  0








Risk Factors:


Risk Factors:  DM, Current or recent (<one month) smoker, HTN, HLP, family 

history of CAD, obesity.


Risk Scores:


Score 0 - 3:  2.5% MACE over next 6 weeks - Discharge Home


Score 4 - 6:  20.3% MACE over next 6 weeks - Admit for Clinical Observation


Score 7 - 10:  72.7% MACE over next 6 weeks - Early Invasive Strategies





Course & Med Decision Making:


Course & Med Decision Making


Pertinent Labs and Imaging studies reviewed. (See chart for details)





Patient follow-up with Dr. Almaraz.  Patient follow-up with her cardiologist. 

 Patient to start on metoprolol XL 25 mg at night.  Patient return if any 

concerns.  Review ED work-up with Dr. Byers.





Impression:





1.  Palpitations/tachycardia


2.  Hypertension





[]





Dragon Disclaimer:


Dragon Disclaimer:


This electronic medical record was generated, in whole or in part, using a voice

 recognition dictation system.





Departure


Departure:


Referrals:  


ANUPAMA ALMARAZ MD (PCP)


Scripts


Metoprolol Succinate (METOPROLOL SUCCINATE ( XL )) 25 Mg Tab.er.24h


25 MG PO DAILY for FOR HYPERTENSION for 30 Days, #30 TAB 0 Refills


   Prov: ADY SOSA MD         21





Dragon Disclaimer


This chart was dictated in whole or in part using Voice Recognition software in 

a busy, high-work load, and often noisy Emergency Department environment.  It 

may contain unintended and wholly unrecognized errors or omissions.





Dragon Disclaimer


This chart was dictated in whole or in part using Voice Recognition software in 

a busy, high-work load, and often noisy Emergency Department environment.  It 

may contain unintended and wholly unrecognized errors or omissions.











ADY SOSA MD           2021 01:12